# Patient Record
Sex: FEMALE | Race: BLACK OR AFRICAN AMERICAN | NOT HISPANIC OR LATINO | URBAN - METROPOLITAN AREA
[De-identification: names, ages, dates, MRNs, and addresses within clinical notes are randomized per-mention and may not be internally consistent; named-entity substitution may affect disease eponyms.]

---

## 2024-03-27 ENCOUNTER — TELEPHONE (OUTPATIENT)
Dept: ORTHOPEDICS | Facility: CLINIC | Age: 44
End: 2024-03-27
Payer: MEDICAID

## 2024-04-24 ENCOUNTER — HOSPITAL ENCOUNTER (EMERGENCY)
Facility: HOSPITAL | Age: 44
Discharge: HOME | End: 2024-04-24
Attending: EMERGENCY MEDICINE
Payer: MEDICAID

## 2024-04-24 VITALS
WEIGHT: 220 LBS | HEART RATE: 77 BPM | TEMPERATURE: 98 F | SYSTOLIC BLOOD PRESSURE: 131 MMHG | RESPIRATION RATE: 18 BRPM | DIASTOLIC BLOOD PRESSURE: 72 MMHG | OXYGEN SATURATION: 97 %

## 2024-04-24 DIAGNOSIS — T67.5XXA HEAT EXHAUSTION, INITIAL ENCOUNTER: Primary | ICD-10-CM

## 2024-04-24 DIAGNOSIS — R53.83 FATIGUE: ICD-10-CM

## 2024-04-24 LAB
ALBUMIN SERPL BCP-MCNC: 3.8 G/DL (ref 3.5–5.2)
ALP SERPL-CCNC: 57 U/L (ref 55–135)
ALT SERPL W/O P-5'-P-CCNC: 15 U/L (ref 10–44)
ANION GAP SERPL CALC-SCNC: 7 MMOL/L (ref 8–16)
AST SERPL-CCNC: 19 U/L (ref 10–40)
B-HCG UR QL: NEGATIVE
BASOPHILS # BLD AUTO: 0.05 K/UL (ref 0–0.2)
BASOPHILS NFR BLD: 0.8 % (ref 0–1.9)
BILIRUB SERPL-MCNC: 0.2 MG/DL (ref 0.1–1)
BILIRUB UR QL STRIP: NEGATIVE
BNP SERPL-MCNC: 11 PG/ML (ref 0–99)
BUN SERPL-MCNC: 15 MG/DL (ref 6–20)
CALCIUM SERPL-MCNC: 9.5 MG/DL (ref 8.7–10.5)
CHLORIDE SERPL-SCNC: 109 MMOL/L (ref 95–110)
CK SERPL-CCNC: 181 U/L (ref 20–180)
CLARITY UR: CLEAR
CO2 SERPL-SCNC: 21 MMOL/L (ref 23–29)
COLOR UR: YELLOW
CREAT SERPL-MCNC: 0.8 MG/DL (ref 0.5–1.4)
CTP QC/QA: YES
DIFFERENTIAL METHOD BLD: ABNORMAL
EOSINOPHIL # BLD AUTO: 0.1 K/UL (ref 0–0.5)
EOSINOPHIL NFR BLD: 2.2 % (ref 0–8)
ERYTHROCYTE [DISTWIDTH] IN BLOOD BY AUTOMATED COUNT: 15.6 % (ref 11.5–14.5)
EST. GFR  (NO RACE VARIABLE): >60 ML/MIN/1.73 M^2
GLUCOSE SERPL-MCNC: 92 MG/DL (ref 70–110)
GLUCOSE UR QL STRIP: NEGATIVE
HCT VFR BLD AUTO: 28.5 % (ref 37–48.5)
HGB BLD-MCNC: 8.4 G/DL (ref 12–16)
HGB UR QL STRIP: NEGATIVE
IMM GRANULOCYTES # BLD AUTO: 0.01 K/UL (ref 0–0.04)
IMM GRANULOCYTES NFR BLD AUTO: 0.2 % (ref 0–0.5)
KETONES UR QL STRIP: NEGATIVE
LEUKOCYTE ESTERASE UR QL STRIP: NEGATIVE
LYMPHOCYTES # BLD AUTO: 2.6 K/UL (ref 1–4.8)
LYMPHOCYTES NFR BLD: 43.1 % (ref 18–48)
MAGNESIUM SERPL-MCNC: 1.9 MG/DL (ref 1.6–2.6)
MCH RBC QN AUTO: 23 PG (ref 27–31)
MCHC RBC AUTO-ENTMCNC: 29.5 G/DL (ref 32–36)
MCV RBC AUTO: 78 FL (ref 82–98)
MONOCYTES # BLD AUTO: 0.6 K/UL (ref 0.3–1)
MONOCYTES NFR BLD: 9.6 % (ref 4–15)
NEUTROPHILS # BLD AUTO: 2.6 K/UL (ref 1.8–7.7)
NEUTROPHILS NFR BLD: 44.1 % (ref 38–73)
NITRITE UR QL STRIP: NEGATIVE
NRBC BLD-RTO: 0 /100 WBC
PH UR STRIP: 6 [PH] (ref 5–8)
PLATELET # BLD AUTO: 292 K/UL (ref 150–450)
PMV BLD AUTO: 8.8 FL (ref 9.2–12.9)
POTASSIUM SERPL-SCNC: 3.9 MMOL/L (ref 3.5–5.1)
PROT SERPL-MCNC: 7.7 G/DL (ref 6–8.4)
PROT UR QL STRIP: NEGATIVE
RBC # BLD AUTO: 3.65 M/UL (ref 4–5.4)
SODIUM SERPL-SCNC: 137 MMOL/L (ref 136–145)
SP GR UR STRIP: 1.01 (ref 1–1.03)
TSH SERPL DL<=0.005 MIU/L-ACNC: 0.94 UIU/ML (ref 0.4–4)
URN SPEC COLLECT METH UR: NORMAL
UROBILINOGEN UR STRIP-ACNC: NEGATIVE EU/DL
WBC # BLD AUTO: 5.91 K/UL (ref 3.9–12.7)

## 2024-04-24 PROCEDURE — 83735 ASSAY OF MAGNESIUM: CPT

## 2024-04-24 PROCEDURE — 84443 ASSAY THYROID STIM HORMONE: CPT

## 2024-04-24 PROCEDURE — 83880 ASSAY OF NATRIURETIC PEPTIDE: CPT

## 2024-04-24 PROCEDURE — 93010 ELECTROCARDIOGRAM REPORT: CPT | Mod: ,,, | Performed by: INTERNAL MEDICINE

## 2024-04-24 PROCEDURE — 82550 ASSAY OF CK (CPK): CPT

## 2024-04-24 PROCEDURE — 81003 URINALYSIS AUTO W/O SCOPE: CPT

## 2024-04-24 PROCEDURE — 80053 COMPREHEN METABOLIC PANEL: CPT

## 2024-04-24 PROCEDURE — 93005 ELECTROCARDIOGRAM TRACING: CPT

## 2024-04-24 PROCEDURE — 85025 COMPLETE CBC W/AUTO DIFF WBC: CPT

## 2024-04-24 PROCEDURE — 99284 EMERGENCY DEPT VISIT MOD MDM: CPT | Mod: 25

## 2024-04-24 PROCEDURE — 81025 URINE PREGNANCY TEST: CPT

## 2024-04-24 NOTE — DISCHARGE INSTRUCTIONS
Thank you for coming to our Emergency Department today. It is important to remember that some problems or medical conditions are difficult to diagnose and may not be found or addressed during your Emergency Department visit.  These conditions often start with non-specific symptoms and can only be diagnosed on follow up visits with your primary care physician or specialist when the symptoms continue or change. Please remember that all medical conditions can change, and we cannot predict how you will be feeling tomorrow or the next day. Return to the ER with any questions/concerns, new/concerning symptoms, worsening or failure to improve.     Please return to ER if you experience severe dizziness, fever higher then 100.4 that persist after medication administration, uncontrolled nausea/vomiting or diarrhea or any other major concern like increased pain, chest pain, shortness of breath, inability to pass stool or gas, or difficulty breathing or swelling of the throat/mouth/tongue.    Be sure to follow up with your primary care doctor and review all labs/imaging/tests that were performed during your ER visit with them. It is very common for us to identify non-emergent incidental findings which must be followed up with your primary care physician.  Some labs/imaging/tests may be outside of the normal range, and require non-emergent follow-up and/or further investigation/treatment/procedures/testing to help diagnose/exclude/prevent complications or other potentially serious medical conditions. Some abnormalities may not have been discussed or addressed during your ER visit.     An ER visit does not replace a primary care visit, and many screening tests or follow-up tests cannot be ordered by an ER doctor or performed by the ER. Some tests may even require pre-approval.

## 2024-04-24 NOTE — ED PROVIDER NOTES
"Encounter Date: 4/24/2024    SCRIBE #1 NOTE: I, Kal Liliana, am scribing for, and in the presence of,  Sia Feliz PA-C.       History     Chief Complaint   Patient presents with    Heat Exposure     Pt reports to ED via EMS for heat exposure while at work. Pt states the air is not working correctly and she got over heated, attempted to cool down with a fan but she got dizzy and lost control of her emotions with being tearful.   Pt vitals stable, denies LOC.      44 y.o. female with no pertinent PMHx, presents for emergent evaluation of a syncopal episode about 30 minutes  prior to arrival. 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  She states that she "lost control of her emotion"  and started to cry.  She had to sit down as she began to feel dizzy prompting coworkers to call EMS.  Patient reports ongoing frustration with office space being overly hot and continually bringing up concerns to management with no changes being made.  She states that this is how the building always is but states that she had to work in this particular area for an extended amount of time today which is likely what led to her becoming overheated. Endorses eating breakfast this morning  and feeling well hydrated. Denies any loss of consciousness or amnesia. Denies any recent medication change. Patient has not taken any medications at this time. Patient denies fever/chills,  diaphoresis, unexplained weight gain/loss, headache, chest pain, shortness of breath, abdominal pain, nausea/vomiting/diarrhea, urinary concerns, neck stiffness, or any other complaints at this time.  Denies similar occurrence happened in the past.      The history is provided by the patient. No  was used.     Review of patient's allergies indicates:   Allergen Reactions    Amoxicillin Dermatitis, Hives, Itching, Rash, Shortness Of Breath " and Swelling    Ampicillin Anaphylaxis     History reviewed. No pertinent past medical history.  History reviewed. No pertinent surgical history.  No family history on file.  Social History     Tobacco Use    Smoking status: Never    Smokeless tobacco: Never   Substance Use Topics    Drug use: Never     Review of Systems   Constitutional:  Positive for fatigue. Negative for chills, diaphoresis, fever and unexpected weight change.   HENT:  Negative for facial swelling and sore throat.    Eyes:  Negative for visual disturbance.   Respiratory:  Negative for cough and shortness of breath.    Cardiovascular:  Negative for chest pain and palpitations.   Gastrointestinal:  Negative for abdominal pain, nausea and vomiting.   Genitourinary:  Negative for decreased urine volume, dysuria and hematuria.   Musculoskeletal:  Negative for myalgias.   Skin:  Negative for rash.   Neurological:  Positive for weakness and light-headedness. Negative for syncope and headaches.   Hematological:  Does not bruise/bleed easily.   Psychiatric/Behavioral: Negative.         Physical Exam     Initial Vitals [04/24/24 1127]   BP Pulse Resp Temp SpO2   131/72 77 18 98.1 °F (36.7 °C) 97 %      MAP       --         Physical Exam    Nursing note and vitals reviewed.  Constitutional: She appears well-developed and well-nourished. She is not diaphoretic. No distress.   HENT:   Head: Normocephalic and atraumatic.   Right Ear: External ear normal.   Left Ear: External ear normal.   Mouth/Throat: Oropharynx is clear and moist.   Eyes: Conjunctivae and EOM are normal. Pupils are equal, round, and reactive to light. Right eye exhibits no discharge. Left eye exhibits no discharge. No scleral icterus.   Neck: Neck supple. No tracheal deviation present.   Normal range of motion.  Cardiovascular:  Normal rate, regular rhythm and normal heart sounds.           Pulmonary/Chest: Breath sounds normal. No respiratory distress. She has no wheezes.   Abdominal:  Abdomen is soft. Bowel sounds are normal. She exhibits no distension. There is no abdominal tenderness. There is no rebound.   Musculoskeletal:         General: No edema. Normal range of motion.      Cervical back: Normal range of motion and neck supple.      Comments: No extremity cyanosis or edema.       Neurological: She is alert and oriented to person, place, and time. She has normal strength. No cranial nerve deficit or sensory deficit. GCS score is 15. GCS eye subscore is 4. GCS verbal subscore is 5. GCS motor subscore is 6.   PERRL, EOMI w/out evidence of nystagmus, No deficits appreciated to light touch bilateral face, No facial weakness, no facial asymmetry. Eyebrow raise symmetric. Smile symmetric, Palate midline, and raises symmetrically, Shoulder shrug 5/5 bilaterally, tongue is midline w/out asymmetry. Strength 5/5 to bilateral upper and lower extremities, sensation intact to light touch     Skin: Skin is warm and dry. Capillary refill takes less than 2 seconds. No rash noted.   Psychiatric: She has a normal mood and affect. Thought content normal.         ED Course   Procedures  Labs Reviewed   CBC W/ AUTO DIFFERENTIAL - Abnormal; Notable for the following components:       Result Value    RBC 3.65 (*)     Hemoglobin 8.4 (*)     Hematocrit 28.5 (*)     MCV 78 (*)     MCH 23.0 (*)     MCHC 29.5 (*)     RDW 15.6 (*)     MPV 8.8 (*)     All other components within normal limits   COMPREHENSIVE METABOLIC PANEL - Abnormal; Notable for the following components:    CO2 21 (*)     Anion Gap 7 (*)     All other components within normal limits   CK - Abnormal; Notable for the following components:     (*)     All other components within normal limits   URINALYSIS, REFLEX TO URINE CULTURE    Narrative:     Specimen Source->Urine   MAGNESIUM   B-TYPE NATRIURETIC PEPTIDE   TSH   POCT URINE PREGNANCY        ECG Results              EKG 12-lead (Final result)        Collection Time Result Time QRS Duration OHS  QTC Calculation    04/24/24 12:34:13 04/25/24 16:10:14 80 422                     Final result by Interface, [facility] (04/25/24 16:10:19)                   Narrative:    Test Reason : R53.83,    Vent. Rate : 061 BPM     Atrial Rate : 061 BPM     P-R Int : 180 ms          QRS Dur : 080 ms      QT Int : 420 ms       P-R-T Axes : 026 073 031 degrees     QTc Int : 422 ms    Normal sinus rhythm  Normal ECG  No previous ECGs available  Confirmed by Dakota Rodriguez MD ([id number]) on 4/25/2024 4:10:13 PM    Referred By: [email or web address]   SELF           Confirmed By:Dakota Rodriguez MD                                     EKG 12-lead (Final result)  Result time 04/25/24 15:44:26      Final result by Unknown User (04/25/24 15:44:26)                                      Imaging Results    None          Medications - No data to display  Medical Decision Making  This is an evaluation of a 44 y.o. female that presents to the Emergency Department for after presyncopal episode while at work.  She believes that she got overheated.  Denies loss of consciousness, N/V/D,  injury to head or changes in vision.   On physical exam, patient is a non-toxic and well appearing and well hydrated with moist mucus membranes. Breath sounds are clear and equal bilaterally with no adventitious breath sounds, tachypnea or respiratory distress. Regular rate and rhythm. No murmurs. Abdomen soft and non tender. Neuro intact.  Strength and sensation intact bilateral upper and lower extremities. Physical exam otherwise as above.      Differentials include arrhythmia, vertigo, viral syndrome, rhabdo, pregnancy, electrolyte abnormality, anemia. VS are reassuring. No conjunctival pallor. Patient denies hx of abnormal/prolong bleeding. Patient is neurologically intact without focal weakness. Steady gait. Negative Romberg. EKG reveals-normal sinus rhythm.  Ventricular rate of 61 bpm CBC without leukocytosis.  Hemoglobin and hematocrit stable. CMP unremarkable  without significant electrolyte derangement, impaired renal function, or elevated LFTs.    If available, previous records reviewed.   Patient tolerating p.o. in ED.  Drinking water and eating solid while labs pending.  Patient updated on all results.  Notes she is feeling much improved, back to her normal self.    My overall impression is heat exhaustion.  Emphasized the importance of oral hydration and low rounded diet.  Advised follow up with PCP for further evaluation.  I discussed with the patient the diagnosis, treatment plan, indications for return to the emergency department, and for expected follow-up. The patient/guardian verbalized an understanding. The patient/guardian is asked if there are any questions or concerns. We discuss the case, until all issues are addressed to the patient/guardian's satisfaction. Patient/guardian understands and is agreeable to the plan. Patient is very well appearing, and in no acute distress. Vital signs are reassuring here in the emergency department. Patient is stable for discharge at this time.    Amount and/or Complexity of Data Reviewed  External Data Reviewed: labs.  Labs: ordered. Decision-making details documented in ED Course.  ECG/medicine tests: ordered and independent interpretation performed. Decision-making details documented in ED Course.            Scribe Attestation:   Scribe #1: I performed the above scribed service and the documentation accurately describes the services I performed. I attest to the accuracy of the note.        ED Course as of 05/14/24 1552   Wed Apr 24, 2024   1247 EKG with normal sinus rhythm.  Ventricular rate of 61 beats per minute.  No QTC prolongation.  No ST elevation or T-wave abnormalities. [CC]   1419 Patient states she is feeling remarkably better.  Talking in the phone and eating lunch when I went to update her on results.  States she is feeling her normal self and ready for discharge. [CC]      ED Course User Index  [CC]  Sia Feliz PA-C I, C. Caballero, Emergency Medicine LAUREN , personally performed the services described in this documentation. All medical record entries made by the scribe were at my direction and in my presence. I have reviewed the chart and agree that the record reflects my personal performance and is accurate and complete.      Clinical Impression:  Final diagnoses:  [R53.83] Fatigue  [T67.5XXA] Heat exhaustion, initial encounter (Primary)          ED Disposition Condition    Discharge Stable          ED Prescriptions    None       Follow-up Information       Follow up With Specialties Details Why Contact Info    [facility] - Emergency Dept Emergency Medicine Go to  For new or worsening symptoms [address] [facility] [place] [address] [phone]             Sia Feliz PA-C  05/14/24 [id number]

## 2024-04-24 NOTE — Clinical Note
"Iris Garcia" Kamlesh was seen and treated in our emergency department on 4/24/2024.  She may return to work on 04/25/2024.       If you have any questions or concerns, please don't hesitate to call.      Sia Feliz PA-C"

## 2024-04-25 LAB
OHS QRS DURATION: 80 MS
OHS QTC CALCULATION: 422 MS

## 2024-06-06 DIAGNOSIS — M25.562 LEFT KNEE PAIN, UNSPECIFIED CHRONICITY: Primary | ICD-10-CM

## 2025-04-09 DIAGNOSIS — E66.01 MORBID (SEVERE) OBESITY DUE TO EXCESS CALORIES: Primary | ICD-10-CM

## 2025-04-23 ENCOUNTER — NUTRITION (OUTPATIENT)
Dept: NUTRITION | Facility: CLINIC | Age: 45
End: 2025-04-23
Payer: MEDICAID

## 2025-04-23 VITALS — BODY MASS INDEX: 36.69 KG/M2 | WEIGHT: 220.19 LBS | HEIGHT: 65 IN

## 2025-04-23 DIAGNOSIS — E66.01 MORBID (SEVERE) OBESITY DUE TO EXCESS CALORIES: ICD-10-CM

## 2025-04-23 DIAGNOSIS — E66.812 CLASS 2 OBESITY WITH BODY MASS INDEX (BMI) OF 37.0 TO 37.9 IN ADULT, UNSPECIFIED OBESITY TYPE, UNSPECIFIED WHETHER SERIOUS COMORBIDITY PRESENT: Primary | ICD-10-CM

## 2025-04-23 PROBLEM — E66.9 OBESITY: Status: ACTIVE | Noted: 2025-04-23

## 2025-04-23 PROCEDURE — 99999PBSHW PR PBB SHADOW TECHNICAL ONLY FILED TO HB: Mod: PBBFAC,,,

## 2025-04-23 PROCEDURE — 99999 PR PBB SHADOW E&M-EST. PATIENT-LVL I: CPT | Mod: PBBFAC,,,

## 2025-04-23 PROCEDURE — 97802 MEDICAL NUTRITION INDIV IN: CPT | Mod: PBBFAC,PN

## 2025-04-23 PROCEDURE — 99211 OFF/OP EST MAY X REQ PHY/QHP: CPT | Mod: PBBFAC,PN

## 2025-04-23 NOTE — PROGRESS NOTES
"Referring Physician: Princess ALEX Nguyen MD     Reason for visit:   Chief Complaint   Patient presents with    Nutrition Counseling    Obesity        Initial Visit    : 1980    Medical Tests and Procedures:  There are no active problems to display for this patient.    No past medical history on file.  No past surgical history on file.     Allergies:   Review of patient's allergies indicates:   Allergen Reactions    Amoxicillin Dermatitis, Hives, Itching, Rash, Shortness Of Breath and Swelling    Ampicillin Anaphylaxis        Anthropometrics  Weight: 99.9 kg (220 lb 3.2 oz)  Height: 5' 4.5" (1.638 m)  BMI: Body mass index is 37.21 kg/m².   IBW: +/-10% Ideal body weight: 55.8 kg (123 lb 2 oz)  ABW: 73.44 kg (162 lbs)    Meds: Medications Prior to Visit[1]    Food/Drug Interactions Noted:  none    Vitamins/Supplements/Herbs:  not consistently moringa, lemon balm, libia, turmeric, pepper, cloves, cinnamon, sea almonte, iron supplements 1-2x/week, vitamin D 1x/week now 1x/month, omega 3, z-lite, raspberry leaf tea    Labs: None available    Nutrition Prescription:   4851-5789 Kcals/day (20-25 kcal/kg ABW)   g protein (1-1.4 g/kg ABW)     Support System:  , friends  Nutrition Related Social Determinants of Health: SDOH: Adequate food in home environment    Diet Hx: Patient primarily eats lean protein foods with fruits and vegetables. Enjoys adding spices to foods. Occasionally eats rice, pasta, steak, and lab (1-2x/week on ). Of note, yesterday was pt's birthday and not indicative of usual diet. She had [employer] blueberry waffle for breakfast. Diet recall consists of food she typically eats when it is not a special occasion. Typically carries snack in purse.     Breakfast: fruits (apple, orange), cucumber, tomato, avocado with egg cut up + cayenne/spice  Lunch: leftovers; salmon/chicken with vegetables sometimes with rice  Dinner: salmon/chicken with vegetables; osei with rice " noodles/Brussel sprouts/seaweed  Snack: kind bars (90 kcal per 1 svg), chocolate/candies, libia candy; unsalted walnuts, dates, seeds  Fluid: water goal 3 L daily (when hits goal helps with digestion and heartburn); most days 1.5-2 L to 1 bottle water daily  Eating out: chicken, rotisserie from [employer], skin on chicken thighs and legs, wings from [employer]    Current activity level and/or physical limitations: moving/walking at work (bank); 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 female visiting for weight management.  lbs. Patient reports weight trending up from 1-2 weeks ago was 210 lbs on home scale. Personal goal weight: 160-170 lbs. Has some reflux, and able to recall list foods that cause reflux. Provided education on foods that cause reflux, and to avoid laying down after meals/snacks. Pt downloaded food diary indu on cell phone ([employer]) and was instructed to keep a food diary for 2 weeks to provide to dietitian at follow up. RD expressed goal of 1-2 lbs weight loss per week. Encouraged patient to measure out foods to be sure they match portions om daily plan. Use measuring cups and food scale to measure. Keep fresh fruits (low calorie) or high protein snacks available. Provided tips on lowering fat and CHO intake and increasing protein intake. Patient does not prefer meal replacement shakes/protein shakes. Has Orgain protein at home.     Nutrition Diagnosis:     Nutrition Problem  Obesity     Related to (etiology):   Excessive energy intake    Signs and Symptoms (as evidenced by):   BMI > 30  Over consumption of  high fat and/or energy dense food     Interventions(treatment strategy):  Collaboration of nutrition care with other providers  Diet recommendations and counseling    Nutrition Diagnosis Status:   New      Recommendations:   1. Keep a food diary (Cronometer) for 2 weeks. Follow up in 1 month to review.  2. Goal of 1500 calories, 100 g protein daily. Monitor portions and serving sizes.   3. Physical activity recommendation:  Get at least 150 minutes per week of moderate-intensity aerobic activity or 75 minutes per week of vigorous aerobic activity, or a combination of both, preferably spread throughout the week.  Add moderate- to high-intensity muscle-strengthening activity (such as resistance or weights) on at least 2 days per week.  Spend less time sitting. Even light-intensity activity can offset some of the risks of being sedentary.  Gain even more benefits by being active at least 300 minutes (5 hours) per week.  Increase amount and intensity gradually over time.  4. At 9 pm cut off eating snacks until morning.      Strategies Implemented:  1500 Calorie Sample Meal Plan, Tips to Support Weight loss     Consultation Time:60 minutes.  Communicated with referring healthcare provider:  Consult note available in pt's Epic chart per MD discretion  Follow Up:1 months.              [1]   No outpatient medications prior to visit.     No facility-administered medications prior to visit.

## 2025-05-25 ENCOUNTER — HOSPITAL ENCOUNTER (EMERGENCY)
Facility: HOSPITAL | Age: 45
Discharge: HOME | End: 2025-05-25
Attending: EMERGENCY MEDICINE
Payer: MEDICAID

## 2025-05-25 VITALS
WEIGHT: 220 LBS | DIASTOLIC BLOOD PRESSURE: 83 MMHG | HEART RATE: 77 BPM | SYSTOLIC BLOOD PRESSURE: 134 MMHG | OXYGEN SATURATION: 98 % | TEMPERATURE: 98 F | BODY MASS INDEX: 37.56 KG/M2 | RESPIRATION RATE: 20 BRPM | HEIGHT: 64 IN

## 2025-05-25 DIAGNOSIS — L25.9 CONTACT DERMATITIS, UNSPECIFIED CONTACT DERMATITIS TYPE, UNSPECIFIED TRIGGER: ICD-10-CM

## 2025-05-25 DIAGNOSIS — R21 RASH AND NONSPECIFIC SKIN ERUPTION: Primary | ICD-10-CM

## 2025-05-25 PROCEDURE — 99284 EMERGENCY DEPT VISIT MOD MDM: CPT | Mod: 25

## 2025-05-25 PROCEDURE — 63600175 PHARM REV CODE 636 W HCPCS

## 2025-05-25 PROCEDURE — 96372 THER/PROPH/DIAG INJ SC/IM: CPT

## 2025-05-25 RX ORDER — HYDROXYZINE HYDROCHLORIDE 25 MG/1
25 TABLET, FILM COATED ORAL EVERY 6 HOURS PRN
Qty: 12 TABLET | Refills: 0 | Status: SHIPPED | OUTPATIENT
Start: 2025-05-25

## 2025-05-25 RX ORDER — DEXAMETHASONE SODIUM PHOSPHATE 4 MG/ML
8 INJECTION, SOLUTION INTRA-ARTICULAR; INTRALESIONAL; INTRAMUSCULAR; INTRAVENOUS; SOFT TISSUE
Status: COMPLETED | OUTPATIENT
Start: 2025-05-25 | End: 2025-05-25

## 2025-05-25 RX ADMIN — DEXAMETHASONE SODIUM PHOSPHATE 8 MG: 4 INJECTION INTRA-ARTICULAR; INTRALESIONAL; INTRAMUSCULAR; INTRAVENOUS; SOFT TISSUE at 02:05

## 2025-05-25 NOTE — ED PROVIDER NOTES
Encounter Date: 5/25/2025       History     Chief Complaint   Patient presents with    Rash     Pt reports rash starting 2 days all over her body.  Pt has hx of contact dermatitis.       Iris Whitlock is a 45 y.o. female with PMHx of urticaria who presents to the ED with a generalized pruritic rash onset a few days ago. Patient reports a rash to bilateral forearms, posterior neck, bilateral posterior shoulders, across her chest, bilateral lower extremities, and groin region. She reports a burning sensation over these areas whenever she scratches the rash. Patient states her  also has a similar rash that began earlier today. She reports they started using a new laundry detergent last week. No other exacerbating or alleviating factors. Reports she takes cetirizine as needed. Patient has no other complaints at the present time.     The history is provided by the patient. No  was used.     Review of patient's allergies indicates:   Allergen Reactions    Amoxicillin Dermatitis, Hives, Itching, Rash, Shortness Of Breath and Swelling    Ampicillin Anaphylaxis     History reviewed. No pertinent past medical history.  History reviewed. No pertinent surgical history.  No family history on file.  Social History[1]  Review of Systems   Constitutional:  Negative for chills and fever.   HENT:  Negative for congestion and sore throat.    Eyes:  Negative for visual disturbance.   Respiratory:  Negative for cough and shortness of breath.    Cardiovascular:  Negative for chest pain.   Gastrointestinal:  Negative for abdominal pain, nausea and vomiting.   Genitourinary:  Negative for dysuria and vaginal discharge.   Skin:  Positive for rash.   Neurological:  Negative for headaches.   Psychiatric/Behavioral:  Negative for decreased concentration.        Physical Exam     Initial Vitals [05/25/25 1416]   BP Pulse Resp Temp SpO2   134/83 77 20 97.8 °F (36.6 °C) 98 %      MAP       --         Physical  Exam    Nursing note and vitals reviewed.  Constitutional: She appears well-developed and well-nourished. She is not diaphoretic. No distress.   HENT:   Head: Normocephalic and atraumatic.   Right Ear: Tympanic membrane and external ear normal.   Left Ear: Tympanic membrane and external ear normal.   Eyes: Conjunctivae and EOM are normal.   Neck: No tracheal deviation present. No JVD present.   Normal range of motion.  Cardiovascular:  Normal rate.           Pulmonary/Chest: No stridor. No respiratory distress.   Musculoskeletal:      Cervical back: Normal range of motion.     Neurological: She is alert and oriented to person, place, and time.   Skin: Rash noted. Rash is papular. No erythema.   There is a diffuse, papular, itchy rash over bilateral forearms, posterior neck, bilateral shoulders, and mons pubis. No mucosal involvement. No palmar ir dorsal involvement. Negative Nikolsky's sign. No petechiae or purpura.    Psychiatric: She has a normal mood and affect.         ED Course   Procedures  Labs Reviewed - No data to display       Imaging Results    None          Medications   dexAMETHasone injection 8 mg (8 mg Intramuscular Given 5/25/25 1441)     Medical Decision Making  This is an emergent evaluation of a 45 year old female presenting to the ED for a rash. Afebrile. Patient is non-toxic appearing and in no acute distress. No respiratory component or evidence of oropharyngeal swelling/edema to suggest anaphylaxis or angioedema. No headache, neck rigidity, or fever to suggest meningitis. No recent medication use or infections to suggest drug eruption or SJS. No systemic symptoms to suggest viral xanthem. Patient does report exposure to a new laundry detergent at home.  At this time, I am unsure of the source of the rash but do not believe it is of emergent etiology.     Discharged home with supportive care. Instructed to follow up with PCP and dermatology for reevaluation and management of symptoms.    I  discussed with the patient the diagnosis, treatment plan, indications for return to the emergency department, and for expected follow-up. The patient verbalized an understanding. The patient is asked if there are any questions or concerns. We discuss the case, until all issues are addressed to the patient's satisfaction. Patient understands and is agreeable to the plan.     Risk  Prescription drug management.            Scribe Attestation:   Scribe #1: I performed the above scribed service and the documentation accurately describes the services I performed. I attest to the accuracy of the note.                             I, Angelica Olson PA-C, personally performed the services described in this documentation. All medical record entries made by the scribe were at my direction and in my presence. I have reviewed the chart and agree that the record reflects my personal performance and is accurate and complete.      DISCLAIMER: This note was prepared with [employer] voice recognition transcription software. Garbled syntax, mangled pronouns, and other bizarre constructions may be attributed to that software system.    Clinical Impression:  Final diagnoses:  [R21] Rash and nonspecific skin eruption (Primary)  [L25.9] Contact dermatitis, unspecified contact dermatitis type, unspecified trigger          ED Disposition Condition    Discharge Stable          ED Prescriptions       Medication Sig Dispense Start Date End Date Auth. Provider    hydrOXYzine HCL (ATARAX) 25 MG tablet Take 1 tablet (25 mg total) by mouth every 6 (six) hours as needed for Itching. 12 tablet 5/25/2025 -- Angelica Olson PA-C          Follow-up Information       Follow up With Specialties Details Why Contact Info    [facility] Emergency Medicine Go to  for new or worsening symptoms [address]  [facility] [place] [id number] [phone]    [facility] -  Call in 3 days As  needed [address] [place] [address]  [phone]                     [1]   Social History  Tobacco Use    Smoking status: Never    Smokeless tobacco: Never   Substance Use Topics    Drug use: Never        Angelica Olson PA-C  05/25/25 1592

## 2025-05-25 NOTE — DISCHARGE INSTRUCTIONS
Thank you for coming to our Emergency Department today. It is important to remember that some problems or medical conditions are difficult to diagnose and may not be found or addressed during your Emergency Department visit.  These conditions often start with non-specific symptoms and can only be diagnosed on follow up visits with your primary care physician or specialist when the symptoms continue or change. Please remember that all medical conditions can change, and we cannot predict how you will be feeling tomorrow or the next day. Return to the ER with any questions/concerns, new/concerning symptoms, worsening or failure to improve.       Be sure to follow up with your primary care doctor and review all labs/imaging/tests that were performed during your ER visit with them. It is very common for us to identify non-emergent incidental findings which must be followed up with your primary care physician.  Some labs/imaging/tests may be outside of the normal range, and require non-emergent follow-up and/or further investigation/treatment/procedures/testing to help diagnose/exclude/prevent complications or other potentially serious medical conditions. Some abnormalities may not have been discussed or addressed during your ER visit.     An ER visit does not replace a primary care visit, and many screening tests or follow-up tests cannot be ordered by an ER doctor or performed by the ER. Some tests may even require pre-approval.    If you do not have a primary care doctor, you may contact the one listed on your discharge paperwork or you may also call the [facility] Appointment Desk at [phone] , or [address] [place] [address] at  [phone] to schedule an appointment, or establish care with a primary care doctor or even a specialist and to obtain information about local resources. It is important to your health that you have a primary care doctor.    Please take all medications as directed. We have done our best to select  a medication for you that will treat your condition however, all medications may potentially have side-effects and it is impossible to predict which medications may give you side-effects or what those side-effects (if any) those medications may give you.  If you feel that you are having a negative effect or side-effect of any medication you should stop taking those medications immediately and seek medical attention. If you feel that you are having a life-threatening reaction call 911.        Do not drive, swim, climb to height, take a bath, operate heavy machinery, drink alcohol or take potentially sedating medications, sign any legal documents or make any important decisions for 24 hours if you have received any pain medications, sedatives or mood altering drugs during your ER visit or within 24 hours of taking them if they have been prescribed to you.     You can find additional resources for Dentists, hearing aids, durable medical equipment, low cost pharmacies and other resources at [email or web address]